# Patient Record
Sex: FEMALE | Race: BLACK OR AFRICAN AMERICAN | NOT HISPANIC OR LATINO | URBAN - METROPOLITAN AREA
[De-identification: names, ages, dates, MRNs, and addresses within clinical notes are randomized per-mention and may not be internally consistent; named-entity substitution may affect disease eponyms.]

---

## 2022-05-31 ENCOUNTER — OFFICE VISIT (OUTPATIENT)
Dept: URBAN - METROPOLITAN AREA CLINIC 44 | Facility: CLINIC | Age: 32
End: 2022-05-31
Payer: COMMERCIAL

## 2022-05-31 VITALS
HEIGHT: 64 IN | BODY MASS INDEX: 50.02 KG/M2 | TEMPERATURE: 98.1 F | WEIGHT: 293 LBS | SYSTOLIC BLOOD PRESSURE: 156 MMHG | HEART RATE: 74 BPM | DIASTOLIC BLOOD PRESSURE: 88 MMHG

## 2022-05-31 DIAGNOSIS — R11.14 BILIOUS VOMITING WITH NAUSEA: ICD-10-CM

## 2022-05-31 PROCEDURE — 99204 OFFICE O/P NEW MOD 45 MIN: CPT | Performed by: INTERNAL MEDICINE

## 2022-05-31 RX ORDER — PANTOPRAZOLE SODIUM 40 MG/1
1 TABLET TABLET, DELAYED RELEASE ORAL TWICE A DAY
OUTPATIENT

## 2022-05-31 RX ORDER — PANTOPRAZOLE SODIUM 40 MG/1
1 TABLET TABLET, DELAYED RELEASE ORAL TWICE A DAY
Status: ACTIVE | COMMUNITY

## 2022-05-31 RX ORDER — METOCLOPRAMIDE HYDROCHLORIDE 5 MG/5ML
10 ML SOLUTION ORAL THREE TIMES A DAY
Qty: 900 ML | Refills: 2 | OUTPATIENT
Start: 2022-05-31

## 2022-05-31 NOTE — HPI-TODAY'S VISIT:
Pt with moribd obesity; chronic reflux refractory to Omeprazole 20mg daily. Presents  as an ER follow up from 5/2/2022 for where states started having worsening reflux and emesis of old food. CBC and CMP 5/2022 with low albumin at 2.8 but other labs wnl. Since ER visit states symptoms not better and keeps having recurrent vomiting and reflux. States no diarrhea but did have a viral syndrome with body aches prior to symptom onset. Also states recent dx of DM2.

## 2022-08-31 ENCOUNTER — DASHBOARD ENCOUNTERS (OUTPATIENT)
Age: 32
End: 2022-08-31

## 2022-09-01 ENCOUNTER — OFFICE VISIT (OUTPATIENT)
Dept: URBAN - METROPOLITAN AREA CLINIC 46 | Facility: CLINIC | Age: 32
End: 2022-09-01

## 2022-09-01 RX ORDER — METOCLOPRAMIDE HYDROCHLORIDE 5 MG/5ML
10 ML SOLUTION ORAL THREE TIMES A DAY
Qty: 900 ML | Refills: 2 | Status: ACTIVE | COMMUNITY
Start: 2022-05-31

## 2022-09-01 RX ORDER — PANTOPRAZOLE SODIUM 40 MG/1
1 TABLET TABLET, DELAYED RELEASE ORAL TWICE A DAY
Status: ACTIVE | COMMUNITY

## 2022-09-01 NOTE — HPI-TODAY'S VISIT:
5/31/2022: Pt with moribd obesity; chronic reflux refractory to Omeprazole 20mg daily. Presents  as an ER follow up from 5/2/2022 for where states started having worsening reflux and emesis of old food. CBC and CMP 5/2022 with low albumin at 2.8 but other labs wnl. Since ER visit states symptoms not better and keeps having recurrent vomiting and reflux. States no diarrhea but did have a viral syndrome with body aches prior to symptom onset. Also states recent dx of DM2. Plan wsa to Start Pantoprazole 40mg daily and use Reglan 10mg TID for 4 weeks.   9/2/2022: Now for a follow up